# Patient Record
Sex: MALE | Race: BLACK OR AFRICAN AMERICAN | NOT HISPANIC OR LATINO | Employment: FULL TIME | URBAN - METROPOLITAN AREA
[De-identification: names, ages, dates, MRNs, and addresses within clinical notes are randomized per-mention and may not be internally consistent; named-entity substitution may affect disease eponyms.]

---

## 2023-09-07 ENCOUNTER — APPOINTMENT (OUTPATIENT)
Dept: CT IMAGING | Facility: HOSPITAL | Age: 48
End: 2023-09-07
Payer: OTHER MISCELLANEOUS

## 2023-09-07 ENCOUNTER — HOSPITAL ENCOUNTER (EMERGENCY)
Facility: HOSPITAL | Age: 48
Discharge: HOME | End: 2023-09-07
Payer: OTHER MISCELLANEOUS

## 2023-09-07 ENCOUNTER — APPOINTMENT (OUTPATIENT)
Dept: GENERAL RADIOLOGY | Facility: HOSPITAL | Age: 48
End: 2023-09-07
Payer: OTHER MISCELLANEOUS

## 2023-09-07 VITALS
SYSTOLIC BLOOD PRESSURE: 154 MMHG | DIASTOLIC BLOOD PRESSURE: 87 MMHG | TEMPERATURE: 98.6 F | RESPIRATION RATE: 19 BRPM | BODY MASS INDEX: 29.35 KG/M2 | OXYGEN SATURATION: 100 % | WEIGHT: 205 LBS | HEART RATE: 87 BPM | HEIGHT: 70 IN

## 2023-09-07 DIAGNOSIS — S16.1XXA STRAIN OF NECK MUSCLE, INITIAL ENCOUNTER: ICD-10-CM

## 2023-09-07 DIAGNOSIS — S50.02XA CONTUSION OF LEFT ELBOW, INITIAL ENCOUNTER: ICD-10-CM
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
## 2023-09-07 DIAGNOSIS — S00.03XA CONTUSION OF SCALP, INITIAL ENCOUNTER: ICD-10-CM

## 2023-09-07 PROCEDURE — 99284 EMERGENCY DEPT VISIT MOD MDM: CPT

## 2023-09-07 PROCEDURE — 72125 CT NECK SPINE W/O DYE: CPT

## 2023-09-07 PROCEDURE — 73070 X-RAY EXAM OF ELBOW: CPT

## 2023-09-07 PROCEDURE — 70450 CT HEAD/BRAIN W/O DYE: CPT

## 2023-09-07 NOTE — ED PROVIDER NOTES
Subjective   History of Present Illness  Patient is a 48-year-old male who reports that he slipped and fell at work around 2:30 PM.  He hit fairly hard on his head.  Was told to come to get evaluated.  Reports some neck stiffness though full ROM is intact.  Also reports some elbow pain but does not remember hitting his elbow.      Review of Systems   Constitutional:  Negative for chills, diaphoresis, fatigue and fever.   HENT:  Negative for congestion, postnasal drip, rhinorrhea, sinus pressure and voice change.    Respiratory:  Negative for cough, choking, chest tightness, shortness of breath, wheezing and stridor.    Cardiovascular:  Negative for chest pain and palpitations.   Gastrointestinal:  Negative for abdominal distention, abdominal pain, nausea and vomiting.   Musculoskeletal:  Positive for arthralgias and neck pain.   Neurological:  Positive for headaches. Negative for dizziness, seizures, facial asymmetry, speech difficulty, light-headedness and numbness.   Psychiatric/Behavioral: Negative.       No past medical history on file.    No Known Allergies    No past surgical history on file.    No family history on file.    Social History     Socioeconomic History    Marital status:            Objective   Physical Exam  Vitals and nursing note reviewed.   Constitutional:       General: He is not in acute distress.     Appearance: Normal appearance. He is well-developed and normal weight. He is not ill-appearing, toxic-appearing or diaphoretic.   HENT:      Head: Normocephalic and atraumatic.      Nose: Nose normal.   Eyes:      Pupils: Pupils are equal, round, and reactive to light.   Pulmonary:      Effort: Pulmonary effort is normal.   Musculoskeletal:         General: Normal range of motion.      Cervical back: Normal range of motion and neck supple.      Comments: Full ROM of neck intact without any difficulties he endorses subjective stiffness.  No pain with palpation over spinous process or  "paraspinal musculature.  Full ROM of the left elbow.  No obvious edema or erythema   Skin:     General: Skin is warm and dry.   Neurological:      General: No focal deficit present.      Mental Status: He is alert and oriented to person, place, and time. Mental status is at baseline.      Cranial Nerves: No cranial nerve deficit.      Sensory: No sensory deficit.      Motor: No weakness.      Coordination: Coordination normal.   Psychiatric:         Mood and Affect: Mood normal.         Behavior: Behavior normal.         Thought Content: Thought content normal.         Judgment: Judgment normal.       Procedures           ED Course                                           Medical Decision Making  Appropriate PPE worn during exam.    /87 (BP Location: Right arm)   Pulse 87   Temp 98.6 °F (37 °C)   Resp 19   Ht 177.8 cm (70\")   Wt 93 kg (205 lb)   SpO2 100%   BMI 29.41 kg/m²    Chart review:    Co-morbidities --  has no past medical history on file.  Lab interpretation --  Labs viewed by me significant for the following:   Radiology interpretation --  X-rays reviewed by me and interpreted by radiologist:  XR ELBOW 2 VIEW LEFT    Result Date: 9/7/2023  XR ELBOW 2 VW LEFT Date of Exam: 9/7/2023 7:11 PM EDT Indication: fall pain Comparison: None available. Findings: No acute fracture or dislocation is noted. No disruption of the anterior or posterior fat pads. Mild soft tissue swelling noted posteriorly at the olecranon.     Impression: Soft tissue swelling with no definite acute osseous abnormality Electronically Signed: Arnel Velez MD  9/7/2023 7:40 PM EDT  Workstation ID: [id number]    CT Head Without Contrast    Result Date: 9/7/2023  CT HEAD WO CONTRAST Date of Exam: 9/7/2023 7:05 PM EDT Indication: fall. Comparison: None available. Technique: Axial CT images were obtained of the head without contrast administration.  Coronal reconstructions were performed.  Automated exposure control and iterative " reconstruction methods were used. Findings: The ventricles and subarachnoid spaces are unremarkable. There is no intracranial hemorrhage. There is no evidence of resent large arterial distribution infarct. There is no edema or mass effect. No midline shift. Limited evaluation of the orbits is unremarkable The visualized paranasal sinuses are unremarkable.. Evaluation of the osseous structure at the appropriate bones window is unremarkable.     Impression: No acute intracranial pathology. Electronically Signed: Tr Ott MD  9/7/2023 7:35 PM EDT  Workstation ID: [id number]    CT Cervical Spine Without Contrast    Result Date: 9/7/2023  CT CERVICAL SPINE WO CONTRAST Date of Exam: 9/7/2023 7:05 PM EDT Indication: fall. Comparison: None available. Technique: Axial CT images were obtained of the cervical spine without contrast administration.  Sagittal and coronal reconstructions were performed.  Automated exposure control and iterative reconstruction methods were used. Findings: There is straightening of the cervical spine which may be positional or due to muscle spasm. *No acute fractures or dislocations. *Normal alignment. *No prevertebral soft tissue swelling. *No osseous destructive lesions. *Vertebral body heights and disc spaces are normal.     1.No acute fractures or dislocations. 2.Straightening  Electronically Signed: Tr Ott MD  9/7/2023 7:35 PM EDT  Workstation ID: [id number]      Plan --patient's imaging were negative for any acute fractures.  He was given signs and symptoms of concussion worsening head injury.  Was stable at time of discharge.  Return precaution vomiting provided stable at time of discharge and agreement plan.    I discussed the findings and recommendations with the patient who voices understanding. Stable while in the ER.     Note Disclaimer: At [facility], we believe that sharing information builds trust and better relationships. You are receiving this note because you are  receiving care at [facility] or recently visited. It is possible you will see health information before a provider has talked with you about it. This kind of information can be easy to misunderstand. To help you fully understand what it means for your health, we urge you to discuss this note with your provider.        Problems Addressed:  Contusion of left elbow, initial encounter: complicated acute illness or injury  Contusion of scalp, initial encounter: complicated acute illness or injury  Fall, initial encounter: complicated acute illness or injury  Strain of neck muscle, initial encounter: complicated acute illness or injury    Amount and/or Complexity of Data Reviewed  Radiology: ordered.        Final diagnoses:   Fall, initial encounter   Contusion of left elbow, initial encounter   Contusion of scalp, initial encounter   Strain of neck muscle, initial encounter       ED Disposition  ED Disposition       ED Disposition   Discharge    Condition   Stable    Comment   --               Erlinda Valenzuela APRN  [address] [facility] [id number]  [facility] [address]  [phone]    Schedule an appointment as soon as possible for a visit in 1 week  As needed, If symptoms worsen         Medication List      No changes were made to your prescriptions during this visit.            Paula Rivero PA-C  09/10/23 [id number]

## 2023-09-07 NOTE — DISCHARGE INSTRUCTIONS
Return to the ER for any worsening symptoms follow-up with your primary care provider alternate ibuprofen and Tylenol for pain.